# Patient Record
(demographics unavailable — no encounter records)

---

## 2025-03-19 NOTE — HISTORY OF PRESENT ILLNESS
[de-identified] : CRISTIAN CHRISTIANSEN is a 56 year old female presenting with acute on chronic 1 to 2-year history of right greater than left neck pain.  She states this started without any specific inciting event.  She initially had x-rays done approximately 1 year ago and went to physical therapy for what was thought to be a radiculopathy.  She also was prescribed gabapentin however given the side effect she did not take it.  She uses Tylenol and heating pad which is somewhat helpful.  Pain is more focally in the right side of the neck and does not radiate down the arms but does endorse a numbness tingling feeling intermittently down both arms infrequently.  Denies any red flag signs or symptoms.  Here today for further evaluation.

## 2025-03-19 NOTE — PHYSICAL EXAM
[de-identified] : Cervical Physical Exam   Inspection: normal   Tenderness: Moderate Location levator scapula right greater than left upper trapezius and cervical paraspinals  Neck ROM  Flexion: normal  Extension: normal  Lateral bending: normal  Rotation: normal    Strength testing: Motor strength is 5/5    Neurological exam: Deep tendon reflexes are symmetrical throughout the upper extremities. Sensation is normal.   No clonus  Spurlings: Negative Hoffmans: Negative [de-identified] : XR of cervical spine and thoracic spine Date: 8/30/2023   Views: 2 views each Performed at [facility]: Yes Impression:   1.  Chronic degenerative changes with left foraminal impingement at C5-6 could be of clinical significance at the patient's left-sided symptoms are in the C6 nerve distribution.   2. Mild cervical thoracic scoliosis.  These images were personally reviewed with original findings documented as above.

## 2025-03-19 NOTE — DISCUSSION/SUMMARY
[de-identified] : CRISTIAN CHRISTIANSEN is a 56 year old female presenting with acute on chronic right greater than left neck pain consistent with myofascial and periscapular neck and shoulder pain.  There is not appear to be any radicular signs on exam today and imaging does not correlate with main symptoms which are more right-sided than left.  There is some mild spondylosis in the cervical spine which may be causing symptoms more so than a radicular pattern.  Plan: 1.  Reassurance provided to patient and discussed conservative measures 2.  Referral given to physical therapy and home exercise program provided 3.  Also encouraged patient to improve her work set up for more ergonomic set up and taking frequent breaks every hour if possible with intermittent burst of exercise.  Also encouraged at least 150 minutes of cardiovascular exercise per week. 4.  Patient will continue to use heating pad for symptom relief and prescribed Flexeril 5 mg to be taken nightly as needed if the symptoms worsen 5.  Patient will follow-up in 3 to 6 months as needed if no improvement.  Can consider C-spine MRI at that time.

## 2025-07-09 NOTE — DISCUSSION/SUMMARY
[de-identified] : CRISTIAN CHRISTIANSEN is a 56 year old female presenting with acute on chronic right greater than left neck pain consistent with myofascial and periscapular neck and shoulder pain. There is not appear to be any radicular signs on exam today and imaging does not correlate with main symptoms which are more right-sided than left. There is some mild spondylosis in the cervical spine which may be causing symptoms more so than a radicular pattern.  Significant improvement in neck/upper back pain from last visit with PT. Here today to discuss acute on chronic L knee pain consistent with OA.  Plan: 1. Referral given to physical therapy and home exercise program provided 2. Voltaren Gel prescribed 3. Follow up in 3 months for possible CSI if no improvement

## 2025-07-09 NOTE — HISTORY OF PRESENT ILLNESS
[de-identified] : CRISTIAN CHRISTIANSEN is a 56 year old female presenting with acute on chronic 1 to 2-year history of right greater than left neck pain. She states this started without any specific inciting event. She initially had x-rays done approximately 1 year ago and went to physical therapy for what was thought to be a radiculopathy. She also was prescribed gabapentin however given the side effect she did not take it. She uses Tylenol and heating pad which is somewhat helpful. Pain is more focally in the right side of the neck and does not radiate down the arms but does endorse a numbness tingling feeling intermittently down both arms infrequently. Denies any red flag signs or symptoms. Here today for further evaluation.  Interval history: Significant improvement in neck/upper back pain from last visit with PT. Here today to discuss acute on chronic L knee pain.

## 2025-07-09 NOTE — PHYSICAL EXAM
[de-identified] : Cervical Physical Exam  Inspection: normal  Tenderness: none Location levator scapula right greater than left upper trapezius and cervical paraspinals  Neck ROM Flexion: normal Extension: normal Lateral bending: normal Rotation: normal  Strength testing: Motor strength is 5/5  Neurological exam: Deep tendon reflexes are symmetrical throughout the upper extremities. Sensation is normal.  No clonus Spurlings: Negative Hoffmans: Negative  Knee (L)  Inspection Skin: normal Effusion: normal Bursa swelling: none  Palpation Tenderness: Moderate Location: medial joint line  Crepitus: none. Defect: none. Popliteal fullness: negative.  Flexion Active ROM: normal Passive ROM: normal    Extension Normal    Straight Leg Raise- can perform Motor strength Flexion: 5/5 Extension: 5/5  Sensory index Normal.  ACL/PCL tests Lachman test: stable Anterior drawer: stable Posterior drawer: stable  MCL/LCL tests MCL laxity: stable LCL laxity: stable  Patellofemoral tests Patellar grind test: negative Patellar apprehension: negative  Meniscal tests Ronaldo's test: normal Thessalys: Normal   [de-identified] :  XR of R knee Date: 7/9/2025   Views: 4 Performed at [facility]: Yes Impression: Moderate left patellofemoral and medial compartment osteoarthritis.  These images were personally reviewed with original findings documented as above  XR of cervical spine and thoracic spine Date: 8/30/2023  Views: 2 views each Performed at [facility]: Yes Impression:  1. Chronic degenerative changes with left foraminal impingement at C5-6 could be of clinical significance at the patient's left-sided symptoms are in the C6 nerve distribution. 2. Mild cervical thoracic scoliosis.  These images were personally reviewed with original findings documented as above.